# Patient Record
(demographics unavailable — no encounter records)

---

## 2025-04-01 NOTE — END OF VISIT
[] : Fellow [FreeTextEntry3] : -  Mr. Valencia is a 40 year-old male with a history of allergic asthma and seasonal allergic rhinitis who presents for evaluation. He was last seen by Dr. Yovany Shin in 2022 and treated with Trelegy Ellipta inhaler as well as albuterol PRN, fluticasone/azelastine nasal sprays. He was noted on CT chest in June 2022 to have some nodular densities likely due to bronchiolitis. Allergy profile positive for cats, grass, cockroach, mucor, oak, hazelnut, wheat peanut, soybean. He reports chronic nasal congestion, postnasal drip. wheezing throughout the day, cough, dyspnea (especially with exertion), and chest tightness. Also with nail disease in fingers and toes of unclear etiology.  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 Symptoms preceded work.  PFTs (11/15/2022) with FEV1 4.12, FEV1/FVC 86%, TLC 6.46, DLCO 26.14.   Peripheral eos earlier this week normal at 150.  ASSESSMENT: Severe persistent allergic asthma Seasonal allergic rhinitis  PLAN: - Check complete PFTs with bronchodilator testing  - Check FeNO evaluate for allergic/eosinophilic airway inflammation - Check CXR PA/Lateral, consider repeat CT chest pending CXR - Check labs today, including CBC with diff, allergy profile, IgE, aspergillus antibodies, and 25-OH vit D - Consider screening for alpha 1 antitrypsin pending clinical response - Start Symbicort 160-4.5 mcg 2 puffs twice daily, rinse after use  - Consider adding LAMA pending clinical response - Albuterol inhaler 2 puffs q6h PRN cough, dyspnea, wheezing, chest tightness - Restart fluticasone nasal spray 2 sprays per nostril twice daily  - Restart azelastine nasal spray 2 sprays per nostril twice daily  - Would start cetirizine/loratadine/fexofenadine in 2 weeks if remains symptomatic - Previously did not tolerate montelukast therapy due to neuropsychiatric side effects - Prevnar 20 given in left arm today - Follow-up in 1-2 months or sooner PRN - [Time Spent: ___ minutes] : I have spent [unfilled] minutes of time on the encounter which excludes teaching and separately reported services.

## 2025-04-01 NOTE — ASSESSMENT
[FreeTextEntry1] : 41 yo M presenting for initial visit. He was diagnosed with allergic asthma and was last seen by [facility] Dr. Yovany Shin in 2022. He was started on trelegy ellipta daily, albuterol, flonase/azelastine. He had a CT chest in 6/9/22 which showed some nodular densities likely bronchiolitis. Allergy profile for cat, grass, cockroach, mucor, oak, hazelnut, wheat peanut, soybean. Stopped all these medications previously because he felt like they weren't helping.  Following here for first time for further care  Plan - Obtain PFTs - Obtain blood work: CBC with diff, IgE, BECKY panel  - Obtain CXR - Start symbicort - Start flonase - previously did not tolerate montelukast - Prevnar 20 administered today, does not want flu shot  - Return to office in 1 month, may consider biologic at that time

## 2025-04-01 NOTE — PHYSICAL EXAM
[No Acute Distress] : no acute distress [Normal Oropharynx] : normal oropharynx [Normal Appearance] : normal appearance [No Neck Mass] : no neck mass [Normal Rate/Rhythm] : normal rate/rhythm [Normal S1, S2] : normal s1, s2 [No Murmurs] : no murmurs [No Resp Distress] : no resp distress [No Abnormalities] : no abnormalities [Benign] : benign [Normal Gait] : normal gait [No Clubbing] : no clubbing [No Cyanosis] : no cyanosis [No Edema] : no edema [FROM] : FROM [Normal Color/ Pigmentation] : normal color/ pigmentation [No Focal Deficits] : no focal deficits [Oriented x3] : oriented x3 [Normal Affect] : normal affect [No Acc Muscle Use] : no acc muscle use [TextBox_68] : scattered expiratory wheeze, worse at base

## 2025-04-01 NOTE — HISTORY OF PRESENT ILLNESS
[Never] : never [Difficulty Maintaining Sleep] : difficulty maintaining sleep [TextBox_4] : 39 yo M presenting for initial visit. He was diagnosed with allergic asthma and was last seen by [facility] Dr. Yovany Shin in 2022. He was started on trelegy ellipta daily, albuterol, flonase/azelastine. He had a CT chest in 6/9/22 which showed some nodular densities likely bronchiolitis. Allergy profile for cat, grass, cockroach, mucor, oak, hazelnut, wheat peanut, soybean was positive. Stopped all these medications previously because he felt like they weren't helping. He notices the wheezing all throughout the day. Associated with cough and shortness of breath. Always a problem - does not notice if it worsens throughout the day. he also has finge rand toe nail problems.  and feels that it is at the same time as his nails. Also has a cough  - which he uses to get the mucous out. The mucous is sometimes green and very dark and can be very thick. Notices shortness of breath, especially with running or climbing the staris.   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 Symptoms are the same after work.    Pmhx: No HTN, DM, no medications Surg Hx: fracture in the arm  Never smoker, no alcohol use Originally from [place], moved in 2009 No hx of lung disease or cancer, mom with DM   [Awakes Unrefreshed] : does not awaken unrefreshed [Cataplexy] : denies cataplexy [Recent  Weight Gain] : no recent weight gain

## 2025-04-01 NOTE — ASSESSMENT
[FreeTextEntry1] : 41 yo M presenting for initial visit. He was diagnosed with allergic asthma and was last seen by [facility] Dr. Yovany Shin in 2022. He was started on trelegy ellipta daily, albuterol, flonase/azelastine. He had a CT chest in 6/9/22 which showed some nodular densities likely bronchiolitis. Allergy profile for cat, grass, cockroach, mucor, oak, hazelnut, wheat peanut, soybean. Stopped all these medications previously because he felt like they weren't helping.  Following here for first time for further care  Plan - Obtain PFTs - Obtain blood work: CBC with diff, IgE, BECKY panel  - Obtain CXR - Start symbicort - Start flonase - previously did not tolerate montelukast - Prevnar 20 administered today, does not want flu shot  - Return to office in 1 month, may consider biologic at that time

## 2025-04-01 NOTE — HISTORY OF PRESENT ILLNESS
[Never] : never [Difficulty Maintaining Sleep] : difficulty maintaining sleep [TextBox_4] : 39 yo M presenting for initial visit. He was diagnosed with allergic asthma and was last seen by [facility] Dr. Yovany Shin in 2022. He was started on trelegy ellipta daily, albuterol, flonase/azelastine. He had a CT chest in 6/9/22 which showed some nodular densities likely bronchiolitis. Allergy profile for cat, grass, cockroach, mucor, oak, hazelnut, wheat peanut, soybean was positive. Stopped all these medications previously because he felt like they weren't helping. He notices the wheezing all throughout the day. Associated with cough and shortness of breath. Always a problem - does not notice if it worsens throughout the day. he also has finge rand toe nail problems.  and feels that it is at the same time as his nails. Also has a cough  - which he uses to get the mucous out. The mucous is sometimes green and very dark and can be very thick. Notices shortness of breath, especially with running or climbing the staris.   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 Symptoms are the same after work.    Pmhx: No HTN, DM, no medications Surg Hx: fracture in the arm  Never smoker, no alcohol use Originally from [place], moved in 2009 No hx of lung disease or cancer, mom with DM   [Awakes Unrefreshed] : does not awaken unrefreshed [Cataplexy] : denies cataplexy [Recent  Weight Gain] : no recent weight gain

## 2025-05-13 NOTE — HISTORY OF PRESENT ILLNESS
[Never] : never [TextBox_4] : Mr. Valencia is a 40 year-old male with a history of allergic asthma and seasonal allergic rhinitis who presents for evaluation. He was last seen by Dr. Yovany Shin in 2022 and treated with Trelegy Ellipta inhaler as well as albuterol PRN, fluticasone/azelastine nasal sprays. He was noted on CT chest in June 2022 to have some nodular densities likely due to bronchiolitis. Allergy profile positive for cats, grass, cockroach, mucor, oak, hazelnut, wheat peanut, soybean. He reports chronic nasal congestion, postnasal drip. wheezing throughout the day, cough, dyspnea (especially with exertion), and chest tightness. Also with nail disease in fingers and toes of unclear etiology.  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 Symptoms preceded work. Pending PFTs. Maintained on Symbicort 160-4.5 mcg 2 puffs twice daily. CXR in April showed clear lungs. Labs with positive allergies and high IgE 199, but peripheral eos 190. He was started on fluticasone nasal spray, which he takes twice daily. Did not start taking azelastine nasal spray or fexofenadine. Reports waking up many nights with wheezing and coughing, but he feels that symptoms are due to postnasal drip as they resolve with coughing. He previously did not tolerate montelukast due to neuropsychiatric side effects.   PFTs (May 2025) with normal spirometry, incomplete bronchodilator response, normal lung volumes, and normal DLCO.  FeNO (May 2025) intermediate at 26 ppb.  CXR (April 2025) with clear lungs.  LABS (April 2025) with borderline high HCT, low MCV, absolute eos 190. IgE high at 199. Allergy profile positive for various trees, grass, walnuts, common silver birch, cottonwood, sheep sorel, white negra. No mold allergies. 25-OH vit D normal at 24.7.  Up to date with Prevnar 20 vaccine in April 2025.

## 2025-05-13 NOTE — PHYSICAL EXAM
[No Acute Distress] : no acute distress [Normal Oropharynx] : normal oropharynx [Normal Appearance] : normal appearance [No Neck Mass] : no neck mass [Normal Rate/Rhythm] : normal rate/rhythm [Normal S1, S2] : normal s1, s2 [No Murmurs] : no murmurs [No Resp Distress] : no resp distress [No Acc Muscle Use] : no acc muscle use [No Abnormalities] : no abnormalities [Benign] : benign [Normal Gait] : normal gait [No Clubbing] : no clubbing [No Cyanosis] : no cyanosis [No Edema] : no edema [FROM] : FROM [Normal Color/ Pigmentation] : normal color/ pigmentation [No Focal Deficits] : no focal deficits [Oriented x3] : oriented x3 [Normal Affect] : normal affect [TextBox_68] : scattered end-expiratory wheeze

## 2025-05-13 NOTE — ASSESSMENT
[FreeTextEntry1] : -  Mr. Valencia is a 40 year-old male with a history of allergic asthma and seasonal allergic rhinitis who presents for evaluation. He was last seen by Dr. Yovany Shin in 2022 and treated with Trelegy Ellipta inhaler as well as albuterol PRN, fluticasone/azelastine nasal sprays. He was noted on CT chest in June 2022 to have some nodular densities likely due to bronchiolitis. Allergy profile positive for cats, grass, cockroach, mucor, oak, hazelnut, wheat peanut, soybean. He reports chronic nasal congestion, postnasal drip. wheezing throughout the day, cough, dyspnea (especially with exertion), and chest tightness. Also with nail disease in fingers and toes of unclear etiology.  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 Symptoms preceded work. Pending PFTs. Maintained on Symbicort 160-4.5 mcg 2 puffs twice daily. CXR in April showed clear lungs. Labs with positive allergies and high IgE 199, but peripheral eos 190. He was started on fluticasone nasal spray, which he takes twice daily. Did not start taking azelastine nasal spray or fexofenadine. Reports waking up many nights with wheezing and coughing, but he feels that symptoms are due to postnasal drip as they resolve with coughing. He previously did not tolerate montelukast due to neuropsychiatric side effects.   PFTs (May 2025) with normal spirometry, incomplete bronchodilator response, normal lung volumes, and normal DLCO.  FeNO (May 2025) intermediate at 26 ppb.  CXR (April 2025) with clear lungs.  LABS (April 2025) with borderline high HCT, low MCV, absolute eos 190. IgE high at 199. Allergy profile positive for various trees, grass, walnuts, common silver birch, cottonwood, sheep sorel, white negra. No mold allergies. 25-OH vit D normal at 24.7.  ASSESSMENT: Moderate persistent allergic asthma Seasonal allergic rhinitis  PLAN: - PFTs reviewed, normal spirometry, lung volumes, and DLCO. Incomplete bronchodilator response - FeNO reviewed, intermediate at 26 ppb, consistent with known allergic asthma - Continue budesonide-formoterol 160-4.5 mcg 2 puffs twice daily, rinse after use - Spacer provided today and he was instructed regarding use - Consider adding LAMA therapy if nocturnal cough and wheezing persist despite control of allergic rhinitis and use of inhaler with spacer - Previously did not tolerate Montelukast therapy due to neuropsychiatric side effects - Continue fluticasone nasal spray 2 sprays per nostril twice daily  - Start azelastine nasal spray 2 sprays per nostril twice daily  - Start fexofenadine 180 mg daily - Recommend nasal saline irrigation twice daily - May require ENT evaluation given history of sinus surgery with relief in the past - Up to date with Prevnar 20 vaccine in April 2025 - Follow-up in 2-3 months or sooner PRN

## 2025-07-08 NOTE — PHYSICAL EXAM
Spoke with patient regarding urine culture. She was seen for diarrhea, along with nausea and vomiting. Her granddaugther and another family member had similar symptoms this week. She states her diarrhea has stopped as of late yesterday. She has gurgling in her abdomen, no pain. She denies urinary symptoms (dysuria, frequency, urgency, retention, change in color or odor). Discussed that she had a low level of contamination in her urine but this could certainly be skewing the results of the urine culture as she states that her urine and bowel movements were coming out all at once due to being sick, and \"it was hard to give a good urine sample that day.\" She is aware of risks and benefits of treating versus not. She is from [place] and here visiting family. She states she will wait until she returns home, will see her PCP, and likely have repeat UA/culture to delineate if true infection or not. She is aware to return for new or worsening symptoms. Patient understands and agrees with  the plan of care. All questions addressed. [No Acute Distress] : no acute distress [Normal Appearance] : normal appearance [No Neck Mass] : no neck mass [Normal Rate/Rhythm] : normal rate/rhythm [Normal S1, S2] : normal s1, s2 [No Murmurs] : no murmurs [No Resp Distress] : no resp distress [No Acc Muscle Use] : no acc muscle use [No Abnormalities] : no abnormalities [Benign] : benign [Normal Gait] : normal gait [No Clubbing] : no clubbing [No Cyanosis] : no cyanosis [No Edema] : no edema [FROM] : FROM [Normal Color/ Pigmentation] : normal color/ pigmentation [No Focal Deficits] : no focal deficits [Oriented x3] : oriented x3 [Normal Affect] : normal affect [Well Nourished] : well nourished [Well Developed] : well developed [Supple] : supple [No JVD] : no jvd [Not Tender] : not tender [TextBox_11] : erythema [TextBox_68] : scattered expiratory wheezing that improves with coughing [TextBox_125] : nail disease with dystrophic nails that are thickened and curved without cuticle

## 2025-07-08 NOTE — ASSESSMENT
[FreeTextEntry1] : -  Mr. Valencia is a 40 year-old male with a history of allergic asthma and seasonal allergic rhinitis who presents for follow-up. His asthma symptoms have significantly improved with ICS-LABA therapy (budesonide-formoterol 160-4.5 mcg 2 puffs twice daily, rinses after use). He was provided a spacer last visit, but does not require to use. Significantly improved wheezing, cough, dyspnea, and chest tightness. No significant nocturnal awakening from asthma, but does have postnasal drip that awakens him about 2 nights per week. Previously did not tolerate montelukast therapy due to neuropsychiatric side effects. He is also adherent with fluticasone and azelastine nasal sprays as well as fexofenadine 180 mg daily. Previously recommended to use nasal saline irrigation 1-2x/day with consideration for ENT evaluation. Continues to have chronic rhinorrhea and green mucous production from the nares. Also with nail disease in fingers and toes of unclear etiology pending dermatology evaluation. 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 Symptoms preceded work.Prior CT chest in June 2022 to have some nodular densities likely due to bronchiolitis. Recent labs in April 2025 with IgE high at 199. Allergy profile positive for various trees, grass, walnuts, common silver birch, cottonwood, sheep sorel, white negra. CXR with clear lungs. PFTs normal and FeNO intermediate at 25 ppb.  PFTs (May 2025) with normal spirometry, incomplete bronchodilator response, normal lung volumes, and normal DLCO.  FeNO (May 2025) intermediate at 26 ppb.  CXR (April 2025) with clear lungs.  LABS (April 2025) with borderline high HCT, low MCV, absolute eos 190. IgE high at 199. Allergy profile positive for various trees, grass, walnuts, common silver birch, cottonwood, sheep sorel, white negra. No mold allergies. 25-OH vit D normal at 24.7.  ASSESSMENT: Moderate persistent allergic asthma Seasonal allergic rhinitis Acute on chronic sinusitis Dystrophic nails, rule out yellow nail syndrome  PLAN: - Persistent postnasal drip with green nasal discharge concerning for acute bacterial sinusitis superimposed on chronic sinusitis, possible underlying nasal polyposis - Start Augmentin 875 mg twice daily for 7 days - Start prednisone 30 mg daily for 3 days, then 20 mg daily for 3 days, then 10 mg daily for 3 days, then stop - Continue fluticasone nasal spray 2 sprays per nostril twice daily - Continue azelastine nasal spray 2 sprays per nostril twice daily - Add ipratropium bromide nasal spray 2 sprays per nostril 2-3 times/day as necessary - Recommend to use [facility] nasal saline irrigation 1-2 times/day - Continue fexofenadine 180 mg daily - Reports CT sinuses last year, will provide CD of images to upload to PACS and report - Recommend ENT evaluation to evaluate extent of chronic sinusitis and to evaluate for nasal polyposis - He is pending dermatology evaluation soon given dystrophic yellow-colored nails that are thickened and curved without cuticle. Given associated sinus disease and asthma, I am concerned regarding possibility of yellow nail syndrome. Recent CXR without evidence of bronchiectasis or pleural effusion, but will consider repeat CT chest for further evaluation (last in 2022 with nodular densities reported as bronchiolitis) - No evidence of lymphedema or other features of yellow nail syndrome at this time - Significantly improved wheezing, cough, and chest tightness with ICS-LABA therapy - PFTs from last visit in May 2025 with normal spirometry, lung volumes, and DLCO. Incomplete bronchodilator response. FeNO intermediate at 26 ppb consistent with known allergic asthma - Continue budesonide-formoterol 160-4.5 mcg 2 puffs twice daily, rinse after use - Spacer previously provided, but he is not requiring to use - Hold off on adding LAMA therapy at this time - Previously did not tolerate Montelukast therapy due to neuropsychiatric side effects - If asthma and chronic sinusitis remain uncontrolled despite above therapies, will consider starting Dupilumab therapy given associated allergies, high IgE, chronic sinusitis, and asthma - Up to date with Prevnar 20 vaccine in April 2025 - Follow-up in 3 months or sooner PRN

## 2025-07-08 NOTE — REVIEW OF SYSTEMS
[Nasal Congestion] : nasal congestion [Postnasal Drip] : postnasal drip [Sinus Problems] : sinus problems [Cough] : cough [Chest Tightness] : chest tightness [Dyspnea] : dyspnea [Wheezing] : wheezing [Seasonal Allergies] : seasonal allergies [Negative] : Endocrine [TextBox_101] : nail disease with dystrophic nails that are thickened and curved without cuticle

## 2025-07-08 NOTE — HISTORY OF PRESENT ILLNESS
[Never] : never [TextBox_4] : Mr. Valencia is a 40 year-old male with a history of allergic asthma and seasonal allergic rhinitis who presents for follow-up. His asthma symptoms have significantly improved with ICS-LABA therapy (budesonide-formoterol 160-4.5 mcg 2 puffs twice daily, rinses after use). He was provided a spacer last visit, but does not require to use. Significantly improved wheezing, cough, dyspnea, and chest tightness. No significant nocturnal awakening from asthma, but does have postnasal drip that awakens him about 2 nights per week. Previously did not tolerate montelukast therapy due to neuropsychiatric side effects. He is also adherent with fluticasone and azelastine nasal sprays as well as fexofenadine 180 mg daily. Previously recommended to use nasal saline irrigation 1-2x/day with consideration for ENT evaluation. Continues to have chronic rhinorrhea and green mucous production from the nares. Also with nail disease in fingers and toes of unclear etiology pending dermatology evaluation. 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 Symptoms preceded work.Prior CT chest in June 2022 to have some nodular densities likely due to bronchiolitis  Recent labs in April 2025 with IgE high at 199. Allergy profile positive for various trees, grass, walnuts, common silver birch, cottonwood, sheep sorel, white negra. CXR with clear lungs. PFTs normal and FeNO intermediate at 25 ppb.  PFTs (May 2025) with normal spirometry, incomplete bronchodilator response, normal lung volumes, and normal DLCO.  FeNO (May 2025) intermediate at 26 ppb.  CXR (April 2025) with clear lungs.  LABS (April 2025) with borderline high HCT, low MCV, absolute eos 190. IgE high at 199. Allergy profile positive for various trees, grass, walnuts, common silver birch, cottonwood, sheep sorel, white negra. No mold allergies. 25-OH vit D normal at 24.7.  Up to date with Prevnar 20 vaccine in April 2025.